# Patient Record
Sex: MALE | Race: WHITE | NOT HISPANIC OR LATINO | Employment: FULL TIME | URBAN - NONMETROPOLITAN AREA
[De-identification: names, ages, dates, MRNs, and addresses within clinical notes are randomized per-mention and may not be internally consistent; named-entity substitution may affect disease eponyms.]

---

## 2021-03-17 PROBLEM — Z87.81 HISTORY OF FEMUR FRACTURE: Status: ACTIVE | Noted: 2021-03-17

## 2022-09-02 ENCOUNTER — NON-PROVIDER VISIT (OUTPATIENT)
Dept: URGENT CARE | Facility: CLINIC | Age: 32
End: 2022-09-02

## 2022-09-02 DIAGNOSIS — Z02.1 PRE-EMPLOYMENT DRUG SCREENING: ICD-10-CM

## 2022-09-02 LAB
AMP AMPHETAMINE: NORMAL
COC COCAINE: NORMAL
INT CON NEG: NEGATIVE
INT CON POS: POSITIVE
MET METHAMPHETAMINES: NORMAL
OPI OPIATES: NORMAL
PCP PHENCYCLIDINE: NORMAL
POC DRUG COMMENT 753798-OCCUPATIONAL HEALTH: NEGATIVE
THC: NORMAL

## 2022-09-02 PROCEDURE — 80305 DRUG TEST PRSMV DIR OPT OBS: CPT | Performed by: NURSE PRACTITIONER

## 2024-04-04 ENCOUNTER — NON-PROVIDER VISIT (OUTPATIENT)
Dept: OCCUPATIONAL MEDICINE | Facility: CLINIC | Age: 34
End: 2024-04-04

## 2024-04-04 DIAGNOSIS — Z02.1 PRE-EMPLOYMENT DRUG SCREENING: ICD-10-CM

## 2024-04-04 LAB
AMP AMPHETAMINE: NORMAL
BAR BARBITURATES: NORMAL
BZO BENZODIAZEPINES: NORMAL
COC COCAINE: NORMAL
INT CON NEG: NORMAL
INT CON POS: NORMAL
MDMA ECSTASY: NORMAL
MET METHAMPHETAMINES: NORMAL
MTD METHADONE: NORMAL
OPI OPIATES: NORMAL
OXY OXYCODONE: NORMAL
PCP PHENCYCLIDINE: NORMAL
POC URINE DRUG SCREEN OCDRS: NEGATIVE
THC: NORMAL

## 2024-04-04 PROCEDURE — 80305 DRUG TEST PRSMV DIR OPT OBS: CPT | Performed by: NURSE PRACTITIONER

## 2024-07-31 ENCOUNTER — OFFICE VISIT (OUTPATIENT)
Dept: URGENT CARE | Facility: PHYSICIAN GROUP | Age: 34
End: 2024-07-31
Payer: COMMERCIAL

## 2024-07-31 ENCOUNTER — APPOINTMENT (OUTPATIENT)
Dept: RADIOLOGY | Facility: IMAGING CENTER | Age: 34
End: 2024-07-31
Payer: COMMERCIAL

## 2024-07-31 ENCOUNTER — HOSPITAL ENCOUNTER (EMERGENCY)
Facility: MEDICAL CENTER | Age: 34
End: 2024-07-31
Attending: EMERGENCY MEDICINE
Payer: COMMERCIAL

## 2024-07-31 VITALS
RESPIRATION RATE: 16 BRPM | TEMPERATURE: 97.8 F | DIASTOLIC BLOOD PRESSURE: 76 MMHG | SYSTOLIC BLOOD PRESSURE: 130 MMHG | HEIGHT: 71 IN | BODY MASS INDEX: 27.35 KG/M2 | HEART RATE: 76 BPM | OXYGEN SATURATION: 95 % | WEIGHT: 195.33 LBS

## 2024-07-31 VITALS
BODY MASS INDEX: 26.74 KG/M2 | OXYGEN SATURATION: 98 % | TEMPERATURE: 97.6 F | WEIGHT: 191 LBS | RESPIRATION RATE: 16 BRPM | HEART RATE: 85 BPM | HEIGHT: 71 IN

## 2024-07-31 DIAGNOSIS — S61.412A LACERATION OF LEFT HAND WITHOUT FOREIGN BODY, INITIAL ENCOUNTER: ICD-10-CM

## 2024-07-31 DIAGNOSIS — S67.22XA CRUSHING INJURY OF LEFT HAND, INITIAL ENCOUNTER: ICD-10-CM

## 2024-07-31 DIAGNOSIS — S65.312A LACERATION OF DEEP PALMAR ARCH OF LEFT HAND, INITIAL ENCOUNTER: ICD-10-CM

## 2024-07-31 PROCEDURE — 73130 X-RAY EXAM OF HAND: CPT | Mod: TC,FY,LT | Performed by: RADIOLOGY

## 2024-07-31 PROCEDURE — 99282 EMERGENCY DEPT VISIT SF MDM: CPT

## 2024-07-31 PROCEDURE — 304999 HCHG REPAIR-SIMPLE/INTERMED LEVEL 1

## 2024-07-31 PROCEDURE — 304217 HCHG IRRIGATION SYSTEM

## 2024-07-31 PROCEDURE — 700101 HCHG RX REV CODE 250: Performed by: EMERGENCY MEDICINE

## 2024-07-31 PROCEDURE — 99204 OFFICE O/P NEW MOD 45 MIN: CPT

## 2024-07-31 PROCEDURE — 303747 HCHG EXTRA SUTURE

## 2024-07-31 RX ORDER — LIDOCAINE HYDROCHLORIDE AND EPINEPHRINE BITARTRATE 20; .01 MG/ML; MG/ML
10 INJECTION, SOLUTION SUBCUTANEOUS ONCE
Status: COMPLETED | OUTPATIENT
Start: 2024-07-31 | End: 2024-07-31

## 2024-07-31 RX ORDER — ACETAMINOPHEN 500 MG
1000 TABLET ORAL ONCE
Status: COMPLETED | OUTPATIENT
Start: 2024-07-31 | End: 2024-07-31

## 2024-07-31 RX ADMIN — Medication 1000 MG: at 18:40

## 2024-07-31 RX ADMIN — LIDOCAINE HYDROCHLORIDE,EPINEPHRINE BITARTRATE 10 ML: 20; .01 INJECTION, SOLUTION INFILTRATION; PERINEURAL at 21:52

## 2024-08-01 NOTE — ED TRIAGE NOTES
Chief Complaint   Patient presents with    Hand Pain     Pt reports dropping a transmission on his left hand around 1500 today. Multiple lacerations to the top and palm of his hand. Pt reports no problem with ROM prior to bandage. Bleeding controlled from . Denies numbness or tingling.     Sent from Urgent Care     Sent from  for further evaluation of hand lacerations.       Tetanus shot in 2020    Patient ambulatory to triage for above complaint. Patient A&Ox4, GCS 15, patient speaking in full sentences. Equal and unlabored respirations. Patient educated on triage process and encouraged to notify staff if condition worsens. Appropriate protocols ordered. Patient returned to the lobby in stable condition.

## 2024-08-01 NOTE — DISCHARGE INSTRUCTIONS
For suture removal and sooner for signs of infection.  Take Motrin alternating with Tylenol as needed for pain control.  Keep left hand elevated and utilize ice for the first 24 hours.

## 2024-08-01 NOTE — ED NOTES
Pt educated on discharge instructions. All questions & concerns addressed. Vitals re-assessed and at pt's baseline. Pt ambulates to exit with steady gait and all belongings.    No IV to d/c    Pt wounds bandaged by ER tech. Pt provided work note per ERP direction

## 2024-08-01 NOTE — PROGRESS NOTES
Subjective:   Pranay Green is a very pleasant 33 y.o. male who presents for:    Chief Complaint   Patient presents with    Trauma     Transmission fell onto left hand, active bleed.        HPI:    The patient was lifting a transmission when it fell and crushed his left hand. Immediately after the incident, he washed the wound with soap and water. He reports that he has sensation in the finger as well. The pain is localized to the MCP joint of the left fourth finger.     Right hand dominant. Last TDAP in 2020.    ROS:    ROS    Medications:      Current Outpatient Medications   Medication Sig    ibuprofen (MOTRIN) 800 MG Tab Take 1 Tab by mouth every 8 hours as needed for Moderate Pain or Inflammation.    IBUPROFEN PO Take  by mouth as needed. (Patient not taking: Reported on 7/31/2024)       Allergies:     No Active Allergies    Problem List:     Patient Active Problem List   Diagnosis    History of femur fracture       Surgical History:    No past surgical history on file.    Past Social Hx:     Social History     Socioeconomic History    Marital status: Single   Tobacco Use    Smoking status: Some Days    Smokeless tobacco: Never   Substance and Sexual Activity    Alcohol use: No    Drug use: No        Past Family Hx:      No family history on file.    Problem list, medications, and allergies reviewed by myself today in Epic.     Objective:     Vitals:    07/31/24 1817   Pulse: 85   Resp: 16   Temp: 36.4 °C (97.6 °F)   SpO2: 98%       Physical Exam    {CT result was reviewed by APRN, confirmed by radiology (Optional):87002}    {Results from POCT (Optional):14589}    Assessment/Plan:     Diagnosis and associated orders:     There are no diagnoses linked to this encounter.       Comments/MDM:     ***         All questions answered. Patient verbalized understanding and is in agreement with this plan of care.     If symptoms are worsening or not improving in 3-5 days, follow-up with PCP or return to .  Differential diagnosis, natural history, and supportive care discussed. AVS handout given and reviewed with patient. Patient educated on red flags and when to seek treatment back in ED or UC.     I personally reviewed prior external notes and test results pertinent to today's visit.  I have independently reviewed and interpreted all diagnostics ordered during this urgent care visit.     This dictation has been created using voice recognition software. The accuracy of the dictation is limited by the abilities of the software. I expect there may be some errors of grammar and possibly content. I made every attempt to manually correct the errors within my dictation. However, errors related to voice recognition software may still exist and should be interpreted within the appropriate context.    This note was electronically signed by JAM Chester      symptoms are worsening or not improving in 3-5 days, follow-up with PCP or return to UC. Differential diagnosis, natural history, and supportive care discussed. AVS handout given and reviewed with patient. Patient educated on red flags and when to seek treatment back in ED or UC.     I personally reviewed prior external notes and test results pertinent to today's visit.  I have independently reviewed and interpreted all diagnostics ordered during this urgent care visit.     This dictation has been created using voice recognition software. The accuracy of the dictation is limited by the abilities of the software. I expect there may be some errors of grammar and possibly content. I made every attempt to manually correct the errors within my dictation. However, errors related to voice recognition software may still exist and should be interpreted within the appropriate context.    This note was electronically signed by JAM Chester

## 2024-08-01 NOTE — ED PROVIDER NOTES
ED Provider Note    CHIEF COMPLAINT  Chief Complaint   Patient presents with    Hand Pain     Pt reports dropping a transmission on his left hand around 1500 today. Multiple lacerations to the top and palm of his hand. Pt reports no problem with ROM prior to bandage. Bleeding controlled from . Denies numbness or tingling.     Sent from Urgent Care     Sent from  for further evaluation of hand lacerations.        EXTERNAL RECORDS REVIEWED  Other reviewed a note from urgent care on the patient presented there earlier for crush injury to his left hand.    HPI/ROS  Pranay Green is a 33 y.o. male who presents with a transfer from urgent care with a crush injury to the left hand.  The patient does have palmar and dorsal lacerations to the left hand.  Does not have any loss of flexion or extension at the MCP nor IP joints.  Most of his discomfort on the dorsal aspect of the hand reveals a large laceration as well as on the palmar aspect please see the media images below.  The patient's tetanus is up-to-date.    PAST MEDICAL HISTORY   has a past medical history of History of femur fracture (3/17/2021), No pertinent past medical history, and No pertinent past surgical history.    SURGICAL HISTORY  patient denies any surgical history    FAMILY HISTORY  History reviewed. No pertinent family history.    SOCIAL HISTORY  Social History     Tobacco Use    Smoking status: Some Days    Smokeless tobacco: Never   Vaping Use    Vaping status: Every Day    Substances: Nicotine   Substance and Sexual Activity    Alcohol use: Yes     Comment: occ.    Drug use: No    Sexual activity: Not on file       CURRENT MEDICATIONS  Home Medications       Reviewed by Payal Hernandez R.N. (Registered Nurse) on 07/31/24 at 210  Med List Status: Partial     Medication Last Dose Status   ibuprofen (MOTRIN) 800 MG Tab  Active   IBUPROFEN PO  Active                    ALLERGIES  No Known Allergies    PHYSICAL EXAM  VITAL SIGNS: BP  "(!) 148/78   Pulse 82   Temp 36.7 °C (98.1 °F) (Temporal)   Resp 14   Ht 1.803 m (5' 11\")   Wt 88.6 kg (195 lb 5.2 oz)   SpO2 93%   BMI 27.24 kg/m²    In general the patient does not appear toxic    Extremities the patient has a 2 cm laceration as depicted below at the palmar aspect of the hand at the base of the second phalanx with full flexion and extension at the MCP joint and IP joints.  The patient also has a significant laceration to the dorsum of the fourth phalanx measuring approximately 3 cm that does traverse over the MCP joint and PIP joint.  The patient again has no loss of flexion or extension.  He also has a small laceration to the dorsum of the left hand measuring approximately 2 cm with no skeletal tenderness in this region.  He has a normal left wrist exam.      Skin please see the media images below          Neurovascular examination is grossly intact of the left hand      RADIOLOGY/   I have independently interpreted the diagnostic imaging associated with this visit and am waiting the final reading from the radiologist.   My preliminary interpretation is as follows: X-ray of the left hand was reviewed from the urgent care and shows no acute fracture nor dislocation    PROCEDURES laceration repair to the palm of the hand measuring 2 cm  Lidocaine with epinephrine was utilized for local anesthetic.  The wound was then irrigated with saline.  After irrigation I closed the wound with three 4-0 Ethilon sutures in a simple interrupted fashion.    Procedure laceration to the dorsum of the left fourth phalanx measuring 3 cm  The laceration was anesthetized with lidocaine and epinephrine and then irrigated with with saline.  After irrigation I placed four 4-0 Ethilon sutures in a simple opted fashion for primary closure.    Procedure laceration to the dorsum of the left hand measuring 2 cm    The laceration was anesthetized with lidocaine and epinephrine and then irrigated with saline.  Subsequently " closed the wound with two 4-0 Ethilon sutures in a simple opted fashion.      COURSE & MEDICAL DECISION MAKING    This a 33-year-old male who presents with a crush injury to the left hand.  X-ray was obtained from the urgent care that shows no evidence of a fracture.  The patient does have several lacerations that were repaired primarily.  The patient's tetanus is up-to-date.  The patient will be discharged home with instructions to alternate Motrin and Tylenol for pain control.  He will also utilize ice and keep the hand elevated.  The patient will return for any signs of infection.  He will follow-up with the urgent care, ER, or his primary care provider in 10 to 14 days for suture removal.      FINAL DIAGNOSIS  1.  Crush injury left hand  2.  2 cm laceration to the dorsum of the left hand  3.  3 cm laceration to the dorsum of the left fourth phalanx  4.  2 cm laceration to the palmar aspect of the left hand    Disposition  The patient will be discharged in stable condition     Electronically signed by: Armando Antonio M.D., 7/31/2024 9:31 PM

## 2024-08-19 ENCOUNTER — OFFICE VISIT (OUTPATIENT)
Dept: MEDICAL GROUP | Facility: PHYSICIAN GROUP | Age: 34
End: 2024-08-19
Payer: COMMERCIAL

## 2024-08-19 VITALS
HEART RATE: 62 BPM | HEIGHT: 71 IN | RESPIRATION RATE: 18 BRPM | BODY MASS INDEX: 26.88 KG/M2 | OXYGEN SATURATION: 97 % | DIASTOLIC BLOOD PRESSURE: 78 MMHG | WEIGHT: 192 LBS | TEMPERATURE: 97.9 F | SYSTOLIC BLOOD PRESSURE: 122 MMHG

## 2024-08-19 DIAGNOSIS — Z00.00 PHYSICAL EXAM, ANNUAL: ICD-10-CM

## 2024-08-19 DIAGNOSIS — Z11.59 ENCOUNTER FOR HEPATITIS C SCREENING TEST FOR LOW RISK PATIENT: ICD-10-CM

## 2024-08-19 DIAGNOSIS — Z11.3 SCREEN FOR STD (SEXUALLY TRANSMITTED DISEASE): ICD-10-CM

## 2024-08-19 DIAGNOSIS — F10.90 HEAVY ALCOHOL CONSUMPTION: ICD-10-CM

## 2024-08-19 DIAGNOSIS — S61.412D LACERATION OF LEFT HAND WITHOUT FOREIGN BODY, SUBSEQUENT ENCOUNTER: ICD-10-CM

## 2024-08-19 PROCEDURE — 99203 OFFICE O/P NEW LOW 30 MIN: CPT | Performed by: PHYSICIAN ASSISTANT

## 2024-08-19 PROCEDURE — 3078F DIAST BP <80 MM HG: CPT | Performed by: PHYSICIAN ASSISTANT

## 2024-08-19 PROCEDURE — 3074F SYST BP LT 130 MM HG: CPT | Performed by: PHYSICIAN ASSISTANT

## 2024-08-19 PROCEDURE — 1126F AMNT PAIN NOTED NONE PRSNT: CPT | Performed by: PHYSICIAN ASSISTANT

## 2024-08-19 ASSESSMENT — PAIN SCALES - GENERAL: PAINLEVEL: NO PAIN

## 2024-08-19 ASSESSMENT — PATIENT HEALTH QUESTIONNAIRE - PHQ9
5. POOR APPETITE OR OVEREATING: 0 - NOT AT ALL
SUM OF ALL RESPONSES TO PHQ QUESTIONS 1-9: 5
CLINICAL INTERPRETATION OF PHQ2 SCORE: 4

## 2024-08-19 NOTE — LETTER
August 19, 2024         Patient: Pranay Green   YOB: 1990   Date of Visit: 8/19/2024           To Whom it May Concern:    Pranay Green was seen in my clinic on 8/19/2024. He is cleared to return to work without restrictions.     If you have any questions or concerns, please don't hesitate to call.        Sincerely,           Andrea Paige P.A.-C.  Electronically Signed

## 2024-08-19 NOTE — PROGRESS NOTES
CC:    Chief Complaint   Patient presents with    [facility]     Hand injury 7/31/24, stitches in since then       HISTORY OF THE PRESENT ILLNESS: Patient is a 33 y.o. male presenting to [facility] primary care     Patient seen in the ER on July 31, 2024 for laceration over his left hand when he was at work and a transmission dropped on his hand.  X-rays performed in the ER returned normal.  Patient reports his hand has been healing well and is requesting letter to return to work without restriction.  Pt has 4 drinks per day after work.     No problem-specific Assessment & Plan notes found for this encounter.    Allergies: Patient has no known allergies.    Current Outpatient Medications Ordered in Epic   Medication Sig Dispense Refill    ibuprofen (MOTRIN) 800 MG Tab Take 1 Tab by mouth every 8 hours as needed for Moderate Pain or Inflammation. 21 Tab 0     No current Epic-ordered facility-administered medications on file.       Past Medical History:   Diagnosis Date    History of femur fracture 3/17/2021    No pertinent past medical history     No pertinent past surgical history        Past Surgical History:   Procedure Laterality Date    OTHER  07/2020    Nail in R leg, surgery to remove, Fx femur       Social History     Tobacco Use    Smoking status: Some Days    Smokeless tobacco: Never   Vaping Use    Vaping status: Every Day    Substances: Nicotine   Substance Use Topics    Alcohol use: Yes     Alcohol/week: 4.2 oz     Types: 7 Cans of beer per week     Comment: occ.    Drug use: No       Social History     Social History Narrative    Not on file       History reviewed. No pertinent family history.    ROS:     - Constitutional: Negative for fever, chills, unexpected weight change, and fatigue/generalized weakness.     - HEENT: Negative for headaches, vision changes, hearing changes, ear pain, ear discharge, rhinorrhea, sinus congestion, sore throat, and neck pain.      - Respiratory: Negative for cough,  "sputum production, chest congestion, dyspnea, wheezing, and crackles.      - Cardiovascular: Negative for chest pain, palpitations, orthopnea, and bilateral lower extremity edema.     - Gastrointestinal: Negative for heartburn, nausea, vomiting, abdominal pain, hematochezia, melena, diarrhea, constipation, and greasy/foul-smelling stools.       - Skin: Negative for rash, itching, cyanotic skin color change.     - Neurological: Negative for dizziness, tingling, tremors, focal sensory deficit, focal weakness and headaches.           .      Exam: /78   Pulse 62   Temp 36.6 °C (97.9 °F) (Temporal)   Resp 18   Ht 1.803 m (5' 11\")   Wt 87.1 kg (192 lb)   SpO2 97%  Body mass index is 26.78 kg/m².    General: Normal appearing. No acute distress.  Skin: Warm and dry.  No obvious lesions.  HEENT: Normocephalic. Eyes conjunctiva clear lids without ptosis, ears normal shape and contour  Cardiovascular: Regular rate and rhythm without murmur.   Respiratory: Clear to auscultation bilaterally, no rhonchi wheezing or rales.  Neurologic: Grossly nonfocal, A&O x3, gait normal,  Musculoskeletal: Positive for 3 well healed lacerations over anterior and posterior L hand.   Extremities: No extremity cyanosis, clubbing, or edema.  Psych: Normal mood and affect. Judgment and insight is normal.    Please note that this dictation was created using voice recognition software. I have made every reasonable attempt to correct obvious errors, but I expect that there are errors of grammar and possibly content that I did not discover before finalizing the note.      Assessment/Plan  1. Physical exam, annual  Labs printed  - CBC WITH DIFFERENTIAL; Future  - Comp Metabolic Panel; Future  - HEMOGLOBIN A1C; Future  - Lipid Profile; Future    2. Screen for STD (sexually transmitted disease)    - HIV AG/AB COMBO ASSAY SCREENING; Future    3. Encounter for hepatitis C screening test for low risk patient    - HEP C VIRUS ANTIBODY; Future    4. " Laceration of left hand without foreign body, subsequent encounter  9 sutures removed without complication    5. Heavy alcohol consumption  Discussed EtOH reduction